# Patient Record
Sex: FEMALE | Race: WHITE | NOT HISPANIC OR LATINO | URBAN - METROPOLITAN AREA
[De-identification: names, ages, dates, MRNs, and addresses within clinical notes are randomized per-mention and may not be internally consistent; named-entity substitution may affect disease eponyms.]

---

## 2017-02-08 ENCOUNTER — APPOINTMENT (OUTPATIENT)
Dept: URBAN - METROPOLITAN AREA CLINIC 211 | Age: 62
Setting detail: DERMATOLOGY
End: 2017-02-10

## 2017-02-08 DIAGNOSIS — L20.84 INTRINSIC (ALLERGIC) ECZEMA: ICD-10-CM

## 2017-02-08 DIAGNOSIS — B35.1 TINEA UNGUIUM: ICD-10-CM

## 2017-02-08 DIAGNOSIS — L29.8 OTHER PRURITUS: ICD-10-CM

## 2017-02-08 PROBLEM — L30.9 DERMATITIS, UNSPECIFIED: Status: ACTIVE | Noted: 2017-02-08

## 2017-02-08 PROBLEM — L40.0 PSORIASIS VULGARIS: Status: ACTIVE | Noted: 2017-02-08

## 2017-02-08 PROCEDURE — OTHER PRESCRIPTION: OTHER

## 2017-02-08 PROCEDURE — 99213 OFFICE O/P EST LOW 20 MIN: CPT

## 2017-02-08 PROCEDURE — OTHER TREATMENT REGIMEN: OTHER

## 2017-02-08 PROCEDURE — OTHER COUNSELING: OTHER

## 2017-02-08 PROCEDURE — OTHER MIPS QUALITY: OTHER

## 2017-02-08 RX ORDER — EFINACONAZOLE 100 MG/ML
SOLUTION TOPICAL
Qty: 1 | Refills: 12 | Status: ERX

## 2017-02-08 ASSESSMENT — LOCATION SIMPLE DESCRIPTION DERM
LOCATION SIMPLE: LEFT FOREARM
LOCATION SIMPLE: LEFT FOREARM
LOCATION SIMPLE: LEFT GREAT TOE
LOCATION SIMPLE: LEFT PRETIBIAL REGION
LOCATION SIMPLE: RIGHT PRETIBIAL REGION
LOCATION SIMPLE: RIGHT FOREARM
LOCATION SIMPLE: RIGHT FOREARM
LOCATION SIMPLE: RIGHT GREAT TOE

## 2017-02-08 ASSESSMENT — LOCATION DETAILED DESCRIPTION DERM
LOCATION DETAILED: RIGHT PROXIMAL DORSAL FOREARM
LOCATION DETAILED: LEFT PROXIMAL DORSAL FOREARM
LOCATION DETAILED: LEFT PROXIMAL DORSAL FOREARM
LOCATION DETAILED: RIGHT PROXIMAL DORSAL FOREARM
LOCATION DETAILED: RIGHT DISTAL PRETIBIAL REGION
LOCATION DETAILED: LEFT DISTAL PRETIBIAL REGION
LOCATION DETAILED: LEFT DISTAL PLANTAR GREAT TOE
LOCATION DETAILED: RIGHT GREAT TOENAIL

## 2017-02-08 ASSESSMENT — LOCATION ZONE DERM
LOCATION ZONE: TOE
LOCATION ZONE: LEG
LOCATION ZONE: TOENAIL
LOCATION ZONE: ARM
LOCATION ZONE: ARM

## 2017-02-08 NOTE — PROCEDURE: MIPS QUALITY
Quality 110: Preventive Care And Screening: Influenza Immunization: Influenza Immunization not Administered because Patient Refused.
Detail Level: Detailed
Quality 130: Documentation Of Current Medications In The Medical Record: Current Medications Documented
Quality 400a: One-Time Screening For Hepatitis C Virus (Hcv) For All Patients: One-time screening for HCV infection not received within 12 month period and no documentation of prior screening, reasont not given.
Quality 131: Pain Assessment And Follow-Up: Pain assessment documented as positive using a standardized tool AND a follow-up plan is documented
Quality 431: Preventive Care And Screening: Unhealthy Alcohol Use - Screening: Patient screened for unhealthy alcohol use using a single question and scores less than 2 times per year
Quality 226: Preventive Care And Screening: Tobacco Use: Screening And Cessation Intervention: Patient screened for tobacco and is a smoker AND received Cessation Counseling

## 2017-02-08 NOTE — PROCEDURE: TREATMENT REGIMEN
Plan: Discussed Path Testing- patient states she has had previously- encouraged patient to have re-tested due to extended allergens tested in this office and its been over 5 years since her last testing
Discontinue Regimen: Dove soap
Continue Regimen: Olux-E foam AAA BID x 2weeks form previous RX
Detail Level: Zone
Otc Regimen: Gold Franco triple action medicated relief body lotion extra strength (green Bottle) AAA for Itch mix with Sarna lotion and keep in frig.  Mix the two lotions 50/50 in a container and put in the refrigerator.  Apply foam first then apply the mixture right before going to bed
Otc Regimen: CeraVe Moisturizing Cream and daily wash\\nZyrtec or Clairitin daily\\nCan continue Benadryl QHS
Continue Regimen: Olux-E Foam AAA BID as needed for rash, Hylatopic Plus AAA BID as needed

## 2017-05-16 RX ORDER — EFINACONAZOLE 100 MG/ML
SOLUTION TOPICAL
Qty: 1 | Refills: 10 | Status: ERX

## 2017-06-07 RX ORDER — EFINACONAZOLE 100 MG/ML
SOLUTION TOPICAL
Qty: 1 | Refills: 10 | Status: ERX

## 2018-06-28 ENCOUNTER — APPOINTMENT (OUTPATIENT)
Dept: URBAN - METROPOLITAN AREA CLINIC 211 | Age: 63
Setting detail: DERMATOLOGY
End: 2018-07-13

## 2018-06-28 DIAGNOSIS — L20.84 INTRINSIC (ALLERGIC) ECZEMA: ICD-10-CM

## 2018-06-28 DIAGNOSIS — D485 NEOPLASM OF UNCERTAIN BEHAVIOR OF SKIN: ICD-10-CM

## 2018-06-28 PROBLEM — D48.5 NEOPLASM OF UNCERTAIN BEHAVIOR OF SKIN: Status: ACTIVE | Noted: 2018-06-28

## 2018-06-28 PROCEDURE — OTHER MIPS QUALITY: OTHER

## 2018-06-28 PROCEDURE — OTHER COUNSELING: OTHER

## 2018-06-28 PROCEDURE — OTHER TREATMENT REGIMEN: OTHER

## 2018-06-28 PROCEDURE — OTHER DEFER: OTHER

## 2018-06-28 PROCEDURE — 99214 OFFICE O/P EST MOD 30 MIN: CPT

## 2018-06-28 PROCEDURE — OTHER PRESCRIPTION: OTHER

## 2018-06-28 PROCEDURE — OTHER REASSURANCE: OTHER

## 2018-06-28 RX ORDER — DESOXIMETASONE 2.5 MG/G
CREAM TOPICAL BID
Qty: 1 | Refills: 3 | Status: ERX | COMMUNITY
Start: 2018-06-28

## 2018-06-28 ASSESSMENT — LOCATION ZONE DERM
LOCATION ZONE: LIP
LOCATION ZONE: TRUNK
LOCATION ZONE: LEG
LOCATION ZONE: FACE
LOCATION ZONE: ARM

## 2018-06-28 ASSESSMENT — LOCATION DETAILED DESCRIPTION DERM
LOCATION DETAILED: LEFT POPLITEAL SKIN
LOCATION DETAILED: RIGHT POPLITEAL SKIN
LOCATION DETAILED: MIDDLE STERNUM
LOCATION DETAILED: LEFT FOREHEAD
LOCATION DETAILED: RIGHT DISTAL DORSAL FOREARM
LOCATION DETAILED: RIGHT UPPER CUTANEOUS LIP
LOCATION DETAILED: LEFT DISTAL DORSAL FOREARM

## 2018-06-28 ASSESSMENT — LOCATION SIMPLE DESCRIPTION DERM
LOCATION SIMPLE: RIGHT FOREARM
LOCATION SIMPLE: LEFT FOREARM
LOCATION SIMPLE: LEFT POPLITEAL SKIN
LOCATION SIMPLE: LEFT FOREHEAD
LOCATION SIMPLE: RIGHT POPLITEAL SKIN
LOCATION SIMPLE: RIGHT LIP
LOCATION SIMPLE: CHEST

## 2018-06-28 NOTE — PROCEDURE: MIPS QUALITY
Quality 226: Preventive Care And Screening: Tobacco Use: Screening And Cessation Intervention: Patient screened for tobacco and never smoked
Detail Level: Detailed
Quality 130: Documentation Of Current Medications In The Medical Record: Current Medications Documented
Quality 131: Pain Assessment And Follow-Up: Pain assessment using a standardized tool is documented as negative, no follow-up plan required
Quality 431: Preventive Care And Screening: Unhealthy Alcohol Use - Screening: Patient screened for unhealthy alcohol use using a single question and scores less than 2 times per year
Quality 110: Preventive Care And Screening: Influenza Immunization: Influenza immunization was not ordered or administered, reason not given

## 2018-06-28 NOTE — HPI: RASH
How Severe Is Your Rash?: moderate
Is This A New Presentation, Or A Follow-Up?: Rash
Additional History: Pain 0/10.\\nPatient is using Dove Sensitive Bar soap, Avolin Lotion, and Gold bold sensitive lotion.

## 2018-06-28 NOTE — PROCEDURE: TREATMENT REGIMEN
Discontinue Regimen: Dove Soap\\nOlux Foam
Initiate Treatment: Topicort AAA BID
Samples Given: Lavycin to some areas on the body to see if it helps and then put the steroid on top. We will like to see if it helps the areas we treat VS areas that are not treated with the cream.
Detail Level: Zone
Plan: 1.Discussed with patient we would Like to patch test again it has been over 6 years since her last test.\\n2. Also discussed with patient bleach baths. \\n3.Patient had questions about Tremfya and we advised this would not be something we would start with.\\nPatient verbalized understanding.

## 2018-07-09 ENCOUNTER — RX ONLY (RX ONLY)
Age: 63
End: 2018-07-09

## 2018-07-09 RX ORDER — DESOXIMETASONE 2.5 MG/ML
SPRAY TOPICAL
Qty: 1 | Refills: 5 | Status: ERX | COMMUNITY
Start: 2018-07-09

## 2018-07-09 RX ORDER — DICAPRYLYL CARBONATE/DIMETH
SPRAY, NON-AEROSOL (ML) TOPICAL
Qty: 1 | Refills: 5 | Status: ERX | COMMUNITY
Start: 2018-07-09

## 2018-07-12 ENCOUNTER — APPOINTMENT (OUTPATIENT)
Dept: URBAN - METROPOLITAN AREA CLINIC 211 | Age: 63
Setting detail: DERMATOLOGY
End: 2018-07-12

## 2018-07-12 DIAGNOSIS — D485 NEOPLASM OF UNCERTAIN BEHAVIOR OF SKIN: ICD-10-CM

## 2018-07-12 PROBLEM — D48.5 NEOPLASM OF UNCERTAIN BEHAVIOR OF SKIN: Status: ACTIVE | Noted: 2018-07-12

## 2018-07-12 PROCEDURE — OTHER BIOPSY BY PUNCH METHOD: OTHER

## 2018-07-12 PROCEDURE — OTHER MIPS QUALITY: OTHER

## 2018-07-12 PROCEDURE — 11100: CPT

## 2018-07-12 ASSESSMENT — LOCATION SIMPLE DESCRIPTION DERM: LOCATION SIMPLE: LEFT FOREHEAD

## 2018-07-12 ASSESSMENT — LOCATION ZONE DERM: LOCATION ZONE: FACE

## 2018-07-12 ASSESSMENT — LOCATION DETAILED DESCRIPTION DERM: LOCATION DETAILED: LEFT INFERIOR FOREHEAD

## 2018-07-12 NOTE — PROCEDURE: BIOPSY BY PUNCH METHOD
Additional Anesthesia Volume In Cc (Will Not Render If 0): 0
Home Suture Removal Text: Patient was provided a home suture removal kit and will remove their sutures at home.  If they have any questions or difficulties they will call the office.
Was A Bandage Applied: Yes
Suture Removal: 14 days
Dressing: bandage
Patient Will Remove Sutures At Home?: No
Size Of Lesion In Cm (Optional): 0.1
Post-Care Instructions: I reviewed with the patient in detail post-care instructions. Patient is to keep the biopsy site dry overnight, and then apply bacitracin twice daily until healed. Patient may apply hydrogen peroxide soaks to remove any crusting.
Notification Instructions: Patient will be notified of biopsy results. However, patient instructed to call the office if not contacted within 2 weeks.
Billing Type: Third-Party Bill
Anesthesia Volume In Cc (Will Not Render If 0): 0.5
Punch Size In Mm: 3
Wound Care: Vaseline
Hemostasis: None
Consent: Written consent was obtained and risks were reviewed including but not limited to scarring, infection, bleeding, scabbing, incomplete removal, nerve damage and allergy to anesthesia.
Biopsy Type: H and E
Epidermal Sutures: 4-0 Ethilon
Anesthesia Type: 1% lidocaine with epinephrine
Detail Level: Detailed

## 2018-07-12 NOTE — PROCEDURE: MIPS QUALITY
Quality 431: Preventive Care And Screening: Unhealthy Alcohol Use - Screening: Patient screened for unhealthy alcohol use using a single question and scores less than 2 times per year
Detail Level: Detailed
Quality 226: Preventive Care And Screening: Tobacco Use: Screening And Cessation Intervention: Patient screened for tobacco and never smoked
Quality 110: Preventive Care And Screening: Influenza Immunization: Influenza immunization was not ordered or administered, reason not given
Quality 265: Biopsy Follow-Up: Biopsy results reviewed, communicated, tracked, and documented
Quality 131: Pain Assessment And Follow-Up: Pain assessment using a standardized tool is documented as negative, no follow-up plan required
Quality 130: Documentation Of Current Medications In The Medical Record: Current Medications Documented

## 2018-07-19 ENCOUNTER — APPOINTMENT (OUTPATIENT)
Dept: URBAN - METROPOLITAN AREA CLINIC 211 | Age: 63
Setting detail: DERMATOLOGY
End: 2018-07-19

## 2018-07-19 DIAGNOSIS — Z48.02 ENCOUNTER FOR REMOVAL OF SUTURES: ICD-10-CM

## 2018-07-19 PROBLEM — M12.9 ARTHROPATHY, UNSPECIFIED: Status: ACTIVE | Noted: 2018-07-19

## 2018-07-19 PROCEDURE — OTHER MIPS QUALITY: OTHER

## 2018-07-19 PROCEDURE — OTHER SUTURE REMOVAL (GLOBAL PERIOD): OTHER

## 2018-07-19 PROCEDURE — 99024 POSTOP FOLLOW-UP VISIT: CPT

## 2018-07-19 ASSESSMENT — LOCATION SIMPLE DESCRIPTION DERM: LOCATION SIMPLE: LEFT FOREHEAD

## 2018-07-19 ASSESSMENT — LOCATION DETAILED DESCRIPTION DERM: LOCATION DETAILED: LEFT SUPERIOR FOREHEAD

## 2018-07-19 ASSESSMENT — LOCATION ZONE DERM: LOCATION ZONE: FACE

## 2018-07-19 NOTE — PROCEDURE: MIPS QUALITY
Quality 131: Pain Assessment And Follow-Up: Pain assessment using a standardized tool is documented as negative, no follow-up plan required
Quality 110: Preventive Care And Screening: Influenza Immunization: Influenza immunization was not ordered or administered, reason not given
Quality 431: Preventive Care And Screening: Unhealthy Alcohol Use - Screening: Patient screened for unhealthy alcohol use using a single question and scores less than 2 times per year
Quality 130: Documentation Of Current Medications In The Medical Record: Current Medications Documented
Detail Level: Detailed
Quality 226: Preventive Care And Screening: Tobacco Use: Screening And Cessation Intervention: Patient screened for tobacco and never smoked

## 2018-07-19 NOTE — PROCEDURE: SUTURE REMOVAL (GLOBAL PERIOD)
Add 55764 Cpt? (Important Note: In 2017 The Use Of 86654 Is Being Tracked By Cms To Determine Future Global Period Reimbursement For Global Periods): yes
Detail Level: Detailed